# Patient Record
Sex: FEMALE | Race: WHITE | NOT HISPANIC OR LATINO | Employment: FULL TIME | URBAN - METROPOLITAN AREA
[De-identification: names, ages, dates, MRNs, and addresses within clinical notes are randomized per-mention and may not be internally consistent; named-entity substitution may affect disease eponyms.]

---

## 2018-11-13 ENCOUNTER — OFFICE VISIT CONVERTED (OUTPATIENT)
Dept: ORTHOPEDIC SURGERY | Facility: CLINIC | Age: 23
End: 2018-11-13
Attending: PHYSICIAN ASSISTANT

## 2018-12-20 ENCOUNTER — CONVERSION ENCOUNTER (OUTPATIENT)
Dept: ORTHOPEDIC SURGERY | Facility: CLINIC | Age: 23
End: 2018-12-20

## 2018-12-20 ENCOUNTER — OFFICE VISIT CONVERTED (OUTPATIENT)
Dept: ORTHOPEDIC SURGERY | Facility: CLINIC | Age: 23
End: 2018-12-20
Attending: ORTHOPAEDIC SURGERY

## 2019-01-29 ENCOUNTER — OFFICE VISIT CONVERTED (OUTPATIENT)
Dept: ORTHOPEDIC SURGERY | Facility: CLINIC | Age: 24
End: 2019-01-29
Attending: ORTHOPAEDIC SURGERY

## 2021-05-15 VITALS — HEIGHT: 71 IN | BODY MASS INDEX: 41.02 KG/M2 | RESPIRATION RATE: 16 BRPM | WEIGHT: 293 LBS

## 2021-05-15 VITALS — WEIGHT: 293 LBS | BODY MASS INDEX: 41.02 KG/M2 | RESPIRATION RATE: 16 BRPM | HEIGHT: 71 IN

## 2021-05-16 VITALS — BODY MASS INDEX: 40.46 KG/M2 | WEIGHT: 289 LBS | RESPIRATION RATE: 18 BRPM | HEIGHT: 71 IN

## 2021-09-25 ENCOUNTER — HOSPITAL ENCOUNTER (EMERGENCY)
Facility: HOSPITAL | Age: 26
Discharge: HOME | End: 2021-09-25
Attending: EMERGENCY MEDICINE | Admitting: EMERGENCY MEDICINE

## 2021-09-25 ENCOUNTER — APPOINTMENT (OUTPATIENT)
Dept: GENERAL RADIOLOGY | Facility: HOSPITAL | Age: 26
End: 2021-09-25

## 2021-09-25 VITALS
RESPIRATION RATE: 18 BRPM | SYSTOLIC BLOOD PRESSURE: 143 MMHG | TEMPERATURE: 98.1 F | HEART RATE: 99 BPM | DIASTOLIC BLOOD PRESSURE: 97 MMHG | WEIGHT: 279.98 LBS | OXYGEN SATURATION: 98 % | HEIGHT: 69 IN | BODY MASS INDEX: 41.47 KG/M2

## 2021-09-25 DIAGNOSIS — M25.512 ACUTE PAIN OF LEFT SHOULDER DUE TO TRAUMA: ICD-10-CM

## 2021-09-25 DIAGNOSIS — S96.911A STRAIN OF RIGHT ANKLE, INITIAL ENCOUNTER: ICD-10-CM

## 2021-09-25 DIAGNOSIS — G89.11 ACUTE PAIN OF LEFT SHOULDER DUE TO TRAUMA: ICD-10-CM
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
## 2021-09-25 PROCEDURE — 99282 EMERGENCY DEPT VISIT SF MDM: CPT

## 2021-09-25 PROCEDURE — 73610 X-RAY EXAM OF ANKLE: CPT

## 2021-09-25 PROCEDURE — 73030 X-RAY EXAM OF SHOULDER: CPT

## 2021-09-25 RX ORDER — ONDANSETRON 4 MG/1
4 TABLET, ORALLY DISINTEGRATING ORAL EVERY 8 HOURS PRN
COMMUNITY

## 2021-09-25 RX ORDER — ALBUTEROL SULFATE 90 UG/1
2 AEROSOL, METERED RESPIRATORY (INHALATION) EVERY 4 HOURS PRN
COMMUNITY

## 2021-09-25 RX ORDER — ACETAMINOPHEN 500 MG
500 TABLET ORAL EVERY 6 HOURS PRN
Qty: 25 TABLET | Refills: 0 | Status: SHIPPED | OUTPATIENT
Start: 2021-09-25

## 2021-09-25 RX ORDER — CYCLOBENZAPRINE HCL 10 MG
10 TABLET ORAL 3 TIMES DAILY PRN
Qty: 12 TABLET | Refills: 0 | Status: SHIPPED | OUTPATIENT
Start: 2021-09-25
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
## 2024-05-28 ENCOUNTER — TELEPHONE (OUTPATIENT)
Dept: GASTROENTEROLOGY | Facility: CLINIC | Age: 29
End: 2024-05-28
Payer: OTHER MISCELLANEOUS

## 2024-05-28 NOTE — TELEPHONE ENCOUNTER
Attempted to contact the patient in regards to the appointment scheduled for 07/10/24 at 8:30 am with [place], she will be out of office on that day and the appointment needs to be rescheduled. Patient did not answer so I left a voicemail with the call back number to get rescheduled.    HUB please do not reschedule, warm transfer to the office so patient can be worked in.

## 2024-05-30 NOTE — TELEPHONE ENCOUNTER
Left a voice message for patient to return call  in regards to rescheduling her appointment on  07/10/24 at 8:00 am with [place], she will be out of office on that day. This is the second attempt in trying to contact patient.     HUB please do not reschedule, warm transfer to the office so patient can be worked in.

## 2024-07-16 ENCOUNTER — TELEPHONE (OUTPATIENT)
Dept: GASTROENTEROLOGY | Facility: CLINIC | Age: 29
End: 2024-07-16

## 2024-07-16 NOTE — TELEPHONE ENCOUNTER
I attempted to contact Macie Wilcox 1995 regarding the appointment no show with ZOE Chinchilla on 07/16/24@3:15. Patient is aware that there is a 24-hour cancellation policy and understands that a no-show letter will be mailed to them at the address on file.Left voice message for patient  to call the office if she would like to reschedule.

## 2024-07-16 NOTE — TELEPHONE ENCOUNTER
Left voice message for patient to return call to see if she would like to come in around 11:15 or 11:30, is she cannot, she can still keep her 3:15 appointment